# Patient Record
Sex: FEMALE | HISPANIC OR LATINO | Employment: OTHER | URBAN - METROPOLITAN AREA
[De-identification: names, ages, dates, MRNs, and addresses within clinical notes are randomized per-mention and may not be internally consistent; named-entity substitution may affect disease eponyms.]

---

## 2024-01-11 ENCOUNTER — OFFICE VISIT (OUTPATIENT)
Dept: FAMILY MEDICINE | Facility: CLINIC | Age: 19
End: 2024-01-11
Payer: COMMERCIAL

## 2024-01-11 VITALS
TEMPERATURE: 98.6 F | HEART RATE: 80 BPM | RESPIRATION RATE: 12 BRPM | WEIGHT: 113 LBS | BODY MASS INDEX: 21.34 KG/M2 | DIASTOLIC BLOOD PRESSURE: 74 MMHG | HEIGHT: 61 IN | OXYGEN SATURATION: 99 % | SYSTOLIC BLOOD PRESSURE: 116 MMHG

## 2024-01-11 DIAGNOSIS — Z00.00 ROUTINE HISTORY AND PHYSICAL EXAMINATION OF ADULT: Primary | ICD-10-CM

## 2024-01-11 PROCEDURE — 90480 ADMN SARSCOV2 VAC 1/ONLY CMP: CPT | Performed by: NURSE PRACTITIONER

## 2024-01-11 PROCEDURE — 90686 IIV4 VACC NO PRSV 0.5 ML IM: CPT | Performed by: NURSE PRACTITIONER

## 2024-01-11 PROCEDURE — 99385 PREV VISIT NEW AGE 18-39: CPT | Mod: 25 | Performed by: NURSE PRACTITIONER

## 2024-01-11 PROCEDURE — 90471 IMMUNIZATION ADMIN: CPT | Performed by: NURSE PRACTITIONER

## 2024-01-11 PROCEDURE — 91320 SARSCV2 VAC 30MCG TRS-SUC IM: CPT | Performed by: NURSE PRACTITIONER

## 2024-01-11 ASSESSMENT — ENCOUNTER SYMPTOMS
FEVER: 0
HEMATURIA: 0
ABDOMINAL PAIN: 0
CONSTIPATION: 1
NAUSEA: 0
SHORTNESS OF BREATH: 0
EYE PAIN: 0
NERVOUS/ANXIOUS: 1
COUGH: 0
DYSURIA: 0
HEARTBURN: 1
HEMATOCHEZIA: 0
MYALGIAS: 0
CHILLS: 0
SORE THROAT: 0
PALPITATIONS: 0
WEAKNESS: 0
PARESTHESIAS: 0
DIARRHEA: 0
DIZZINESS: 0
FREQUENCY: 0
HEADACHES: 1
JOINT SWELLING: 0
BREAST MASS: 0
ARTHRALGIAS: 0

## 2024-01-11 NOTE — PROGRESS NOTES
SUBJECTIVE:   Suki is a 18 year old, presenting for the following:    Here with mom whom she lives with  They operate a cleaning business together  Struggles with insomnia a bit which affects mood, will trial OTC remedies  Skips breakfast, counseled regarding healthy diet  Never sexually active. Monthly cramping periods. Advise motrin or consider hormone treatment with BCP. She will try the motrin and return if needs further address    Has had lipids tested in past normal.    Physical        2024     7:20 AM   Additional Questions   Roomed by LA   Accompanied by mother       Healthy Habits:     Getting at least 3 servings of Calcium per day:  Yes    Bi-annual eye exam:  Yes    Dental care twice a year:  NO    Sleep apnea or symptoms of sleep apnea:  None    Diet:  Breakfast skipped    Frequency of exercise:  1 day/week    Duration of exercise:  15-30 minutes    Taking medications regularly:  Yes    Medication side effects:  Not applicable      Today's PHQ-2 Score:       2024     7:19 AM   PHQ-2 (  [employer])   Q1: Little interest or pleasure in doing things 1   Q2: Feeling down, depressed or hopeless 0   PHQ-2 Score 1   Q1: Little interest or pleasure in doing things Several days   Q2: Feeling down, depressed or hopeless Not at all   PHQ-2 Score 1         Social History     Tobacco Use    Smoking status: Never     Passive exposure: Never    Smokeless tobacco: Never   Substance Use Topics    Alcohol use: Not on file             2024     7:18 AM   Alcohol Use   Prescreen: >3 drinks/day or >7 drinks/week? No     Reviewed orders with patient.  Reviewed health maintenance and updated orders accordingly - Yes      Breast Cancer Screenin2023 5 day periods, this was LMP    History of abnormal Pap smear: NO - under age 21, PAP not appropriate for age     Reviewed and updated as needed this visit by clinical staff   Tobacco  Allergies  Meds              Reviewed and updated as needed this  "visit by Provider                     Review of Systems   Constitutional:  Negative for chills and fever.   HENT:  Negative for congestion, ear pain, hearing loss and sore throat.    Eyes:  Negative for pain and visual disturbance.   Respiratory:  Negative for cough and shortness of breath.    Cardiovascular:  Negative for chest pain, palpitations and peripheral edema.   Gastrointestinal:  Positive for constipation and heartburn. Negative for abdominal pain, diarrhea, hematochezia and nausea.   Breasts:  Negative for tenderness, breast mass and discharge.   Genitourinary:  Positive for vaginal discharge. Negative for dysuria, frequency, genital sores, hematuria, pelvic pain, urgency and vaginal bleeding.   Musculoskeletal:  Negative for arthralgias, joint swelling and myalgias.   Skin:  Negative for rash.   Neurological:  Positive for headaches. Negative for dizziness, weakness and paresthesias.   Psychiatric/Behavioral:  Positive for mood changes. The patient is nervous/anxious.           OBJECTIVE:   /74 (BP Location: Right arm, Patient Position: Sitting, Cuff Size: Adult Regular)   Pulse 80   Temp 98.6  F (37  C) (Tympanic)   Resp 12   Ht 1.549 m (5' 1\")   Wt 51.3 kg (113 lb)   LMP 12/24/2023 (Exact Date)   SpO2 99%   BMI 21.35 kg/m    Physical Exam          ASSESSMENT/PLAN:       ICD-10-CM    1. Routine history and physical examination of adult  Z00.00                 COUNSELING:  Reviewed preventive health counseling, as reflected in patient instructions       Regular exercise       Healthy diet/nutrition       Vision screening       Contraception  Will get her vaccine records ffom [place]  Would like COVID and flu shot today      She reports that she has never smoked. She has never been exposed to tobacco smoke. She has never used smokeless tobacco.          Pamela Gomez NP  [facility]  "

## 2024-02-26 ENCOUNTER — ALLIED HEALTH/NURSE VISIT (OUTPATIENT)
Dept: FAMILY MEDICINE | Facility: CLINIC | Age: 19
End: 2024-02-26
Payer: COMMERCIAL

## 2024-02-26 DIAGNOSIS — Z23 ENCOUNTER FOR IMMUNIZATION: Primary | ICD-10-CM

## 2024-02-26 PROCEDURE — 90651 9VHPV VACCINE 2/3 DOSE IM: CPT

## 2024-02-26 PROCEDURE — 90472 IMMUNIZATION ADMIN EACH ADD: CPT

## 2024-02-26 PROCEDURE — 99207 PR NO CHARGE NURSE ONLY: CPT

## 2024-02-26 PROCEDURE — 90715 TDAP VACCINE 7 YRS/> IM: CPT

## 2024-02-26 PROCEDURE — 90471 IMMUNIZATION ADMIN: CPT

## 2024-02-26 NOTE — PROGRESS NOTES
Prior to immunization administration, verified patients identity using patient s name and date of birth. Please see Immunization Activity for additional information.     Screening Questionnaire for Adult Immunization    Are you sick today?   No   Do you have allergies to medications, food, a vaccine component or latex?   No   Have you ever had a serious reaction after receiving a vaccination?   No   Do you have a long-term health problem with heart, lung, kidney, or metabolic disease (e.g., diabetes), asthma, a blood disorder, no spleen, complement component deficiency, a cochlear implant, or a spinal fluid leak?  Are you on long-term aspirin therapy?   No   Do you have cancer, leukemia, HIV/AIDS, or any other immune system problem?   No   Do you have a parent, brother, or sister with an immune system problem?   No   In the past 3 months, have you taken medications that affect  your immune system, such as prednisone, other steroids, or anticancer drugs; drugs for the treatment of rheumatoid arthritis, Crohn s disease, or psoriasis; or have you had radiation treatments?   No   Have you had a seizure, or a brain or other nervous system problem?   No   During the past year, have you received a transfusion of blood or blood    products, or been given immune (gamma) globulin or antiviral drug?   No   For women: Are you pregnant or is there a chance you could become       pregnant during the next month?   No   Have you received any vaccinations in the past 4 weeks?   No     Immunization questionnaire answers were all negative.    I have reviewed the following standing orders:   This patient is due and qualifies for the HPV vaccine.    Click here for HPV (Adult 15-45Y) Standing Order     I have reviewed the vaccines inclusion and exclusion criteria;No concerns regarding eligibility.           This patient is due and qualifies for a TDAP vaccine.    Click here for Tdap Standing Order    I have reviewed the vaccines  inclusion and exclusion criteria; No concerns regarding eligibility.     Patient instructed to remain in clinic for 15 minutes afterwards, and to report any adverse reactions.     Screening performed by Marli Pinto MA on 2/26/2024 at 3:20 PM.

## 2024-03-22 ENCOUNTER — OFFICE VISIT (OUTPATIENT)
Dept: FAMILY MEDICINE | Facility: CLINIC | Age: 19
End: 2024-03-22
Payer: COMMERCIAL

## 2024-03-22 VITALS
DIASTOLIC BLOOD PRESSURE: 74 MMHG | RESPIRATION RATE: 16 BRPM | WEIGHT: 116.9 LBS | HEART RATE: 101 BPM | TEMPERATURE: 98.3 F | BODY MASS INDEX: 22.07 KG/M2 | HEIGHT: 61 IN | SYSTOLIC BLOOD PRESSURE: 106 MMHG | OXYGEN SATURATION: 98 %

## 2024-03-22 DIAGNOSIS — L23.9: Primary | ICD-10-CM

## 2024-03-22 PROCEDURE — 99213 OFFICE O/P EST LOW 20 MIN: CPT | Performed by: NURSE PRACTITIONER

## 2024-03-22 NOTE — PATIENT INSTRUCTIONS
Use over the counter Cortaid twice a day then Juli  Okay to use cetirizine (Zyrtec) 10mg once day for allergy symptoms  NO Latex gloves

## 2024-03-22 NOTE — PROGRESS NOTES
"  Assessment & Plan     (L23.9) Allergic contact dermatitis of hand  (primary encounter diagnosis)  Comment: No latex gloves  Plan: OTC Zyrtec 10 mg once daily and hydrocortisone cream twice a day                  Subjective   Suki is a 18 year old, presenting for the following health issues: patient has rash on back of both hands, skin is dry, sometimes the areas are painful and burning, using lotions seemed to be irritating so she stopped  Rash  Red rash first noted 1/19/24 on the left hand; then on the right. Reports her hands were painful with burning sensation last night. Ice helped momentarily. Started using new gloves with cleaning in January. She stopped wearing the gloves about 2 weeks ago and the redness is improving. Mother reports she has complained of some chest discomfort as well.      3/22/2024     3:02 PM   Additional Questions   Roomed by benita rutherford   Accompanied by self     Rash  Associated symptoms include a rash.   History of Present Illness       Reason for visit:  Rash and burning sensation on both of my hands  Symptom onset:  3-4 weeks ago  Symptoms include:  Rash, dryness,burning, swollen, redness  Symptom intensity:  Severe  Symptom progression:  Staying the same  Had these symptoms before:  No  What makes it worse:  Any liquid thats not water  What makes it better:  Ice pack    She eats 2-3 servings of fruits and vegetables daily.She consumes 1 sweetened beverage(s) daily.She exercises with enough effort to increase her heart rate 60 or more minutes per day.  She exercises with enough effort to increase her heart rate 5 days per week.   She is taking medications regularly.               Objective    /74 (BP Location: Right arm, Patient Position: Sitting)   Pulse 101   Temp 98.3  F (36.8  C)   Resp 16   Ht 1.549 m (5' 1\")   Wt 53 kg (116 lb 14.4 oz)   LMP 02/25/2024 (Exact Date)   SpO2 98%   Breastfeeding No   BMI 22.09 kg/m    Body mass index is 22.09 kg/m .  Physical Exam "   GENERAL: alert and no distress  RESP: lungs clear to auscultation - no rales, rhonchi or wheezes  CV: regular rate and rhythm, normal S1 S2, no S3 or S4, no murmur, click or rub, no peripheral edema  SKIN: xerosis - bilateral hands over knuckles            Signed Electronically by: Pamela Gomez NP

## 2024-04-26 ENCOUNTER — OFFICE VISIT (OUTPATIENT)
Dept: FAMILY MEDICINE | Facility: CLINIC | Age: 19
End: 2024-04-26
Payer: COMMERCIAL

## 2024-04-26 VITALS
DIASTOLIC BLOOD PRESSURE: 66 MMHG | WEIGHT: 115.2 LBS | RESPIRATION RATE: 20 BRPM | SYSTOLIC BLOOD PRESSURE: 110 MMHG | TEMPERATURE: 97.9 F | HEIGHT: 61 IN | HEART RATE: 83 BPM | OXYGEN SATURATION: 99 % | BODY MASS INDEX: 21.75 KG/M2

## 2024-04-26 DIAGNOSIS — R30.0 DYSURIA: ICD-10-CM

## 2024-04-26 DIAGNOSIS — R05.8 PRODUCTIVE COUGH: ICD-10-CM

## 2024-04-26 DIAGNOSIS — Z11.3 SCREENING FOR STDS (SEXUALLY TRANSMITTED DISEASES): Primary | ICD-10-CM

## 2024-04-26 LAB
ALBUMIN UR-MCNC: NEGATIVE MG/DL
APPEARANCE UR: CLEAR
BILIRUB UR QL STRIP: NEGATIVE
C TRACH DNA SPEC QL PROBE+SIG AMP: NEGATIVE
COLOR UR AUTO: YELLOW
GLUCOSE UR STRIP-MCNC: NEGATIVE MG/DL
HGB UR QL STRIP: NEGATIVE
KETONES UR STRIP-MCNC: NEGATIVE MG/DL
LEUKOCYTE ESTERASE UR QL STRIP: NEGATIVE
N GONORRHOEA DNA SPEC QL NAA+PROBE: NEGATIVE
NITRATE UR QL: NEGATIVE
PH UR STRIP: 7.5 [PH] (ref 5–7)
SP GR UR STRIP: 1.02 (ref 1–1.03)
UROBILINOGEN UR STRIP-ACNC: 0.2 E.U./DL

## 2024-04-26 PROCEDURE — 87491 CHLMYD TRACH DNA AMP PROBE: CPT | Performed by: PHYSICIAN ASSISTANT

## 2024-04-26 PROCEDURE — 99214 OFFICE O/P EST MOD 30 MIN: CPT | Performed by: PHYSICIAN ASSISTANT

## 2024-04-26 PROCEDURE — 81003 URINALYSIS AUTO W/O SCOPE: CPT | Mod: QW | Performed by: PHYSICIAN ASSISTANT

## 2024-04-26 PROCEDURE — 87591 N.GONORRHOEAE DNA AMP PROB: CPT | Performed by: PHYSICIAN ASSISTANT

## 2024-04-26 RX ORDER — CEFDINIR 300 MG/1
300 CAPSULE ORAL 2 TIMES DAILY
Qty: 14 CAPSULE | Refills: 0 | Status: SHIPPED | OUTPATIENT
Start: 2024-04-26 | End: 2024-05-22

## 2024-04-26 ASSESSMENT — ENCOUNTER SYMPTOMS
SORE THROAT: 1
HEADACHES: 1
COUGH: 1

## 2024-04-26 NOTE — PROGRESS NOTES
Results for orders placed or performed in visit on 04/26/24   UA Macroscopic with reflex to Microscopic and Culture - Lab Collect     Status: Abnormal    Specimen: Urine, Clean Catch   Result Value Ref Range    Color Urine Yellow Colorless, Straw, Light Yellow, Yellow    Appearance Urine Clear Clear    Glucose Urine Negative Negative mg/dL    Bilirubin Urine Negative Negative    Ketones Urine Negative Negative mg/dL    Specific Gravity Urine 1.020 1.003 - 1.035    Blood Urine Negative Negative    pH Urine 7.5 (H) 5.0 - 7.0    Protein Albumin Urine Negative Negative mg/dL    Urobilinogen Urine 0.2 0.2, 1.0 E.U./dL    Nitrite Urine Negative Negative    Leukocyte Esterase Urine Negative Negative    Narrative    Microscopic not indicated       Assessment & Plan     (Z11.3) Screening for STDs (sexually transmitted diseases)  (primary encounter diagnosis)  Comment:   Plan: Chlamydia trachomatis/Neisseria gonorrhoeae by         PCR- VAGINAL SELF-SWAB        Labs pending    (R30.0) Dysuria  Comment: Urine unremarkable  Plan: UA Macroscopic with reflex to Microscopic and         Culture - Lab Collect        Push fluids follow-up if symptoms persist    (R05.8) Productive cough  Comment: Due to lung findings and the productive cough we will cover with Omnicef should slowly steadily improve not acutely worsen or recheck  Plan:                   Subjective   Suki is a 18 year old, presenting for the following health issues:  Cough (Since Sunday ), Headache, Pharyngitis (Due to coughing ), and UTI (Dysuria, urinary frequency, lower back pain x 3 days )        4/26/2024     9:03 AM   Additional Questions   Roomed by LUISANA Louis   Accompanied by mom- Arcelia     Patient here with mother with complaint of cough since Sunday feels warm at night has not documented fever  Cough occasionally productive of green sputum  No nasal congestion no sore throat  No sick contact  No dysuria last 2 days no frequency no chills no  "hematuria    History of Present Illness       Reason for visit:  Cough  Symptom onset:  3-7 days ago  Symptoms include:  Fever, headeaches, back pain, cough, sore throat, burning when peeing  Symptom intensity:  Severe  Symptom progression:  Worsening  Had these symptoms before:  No  What makes it better:  No    She eats 2-3 servings of fruits and vegetables daily.She consumes 1 sweetened beverage(s) daily.She exercises with enough effort to increase her heart rate 20 to 29 minutes per day.  She exercises with enough effort to increase her heart rate 5 days per week.   She is taking medications regularly.                     Objective    /66 (BP Location: Right arm, Patient Position: Sitting, Cuff Size: Adult Regular)   Pulse 83   Temp 97.9  F (36.6  C) (Tympanic)   Resp 20   Ht 1.549 m (5' 1\")   Wt 52.3 kg (115 lb 3.2 oz)   LMP 04/04/2024 (Exact Date)   SpO2 99%   BMI 21.77 kg/m    Body mass index is 21.77 kg/m .  Physical Exam attentive no acute distress respirations nonlabored  Ears canals and drums normal  Nasal mucosa is clear  Eyes conjunctiva pink  Oropharynx benign  Neck supple no adenopathy  Lungs some scattered rhonchi but good air exchange  Cardiovascular regular rhythm  No CVA tenderness  Some mild suprapubic tenderness no guarding no rebound              Signed Electronically by: LENIN Castle    "

## 2024-05-22 ENCOUNTER — OFFICE VISIT (OUTPATIENT)
Dept: FAMILY MEDICINE | Facility: CLINIC | Age: 19
End: 2024-05-22
Payer: COMMERCIAL

## 2024-05-22 VITALS
HEIGHT: 61 IN | DIASTOLIC BLOOD PRESSURE: 66 MMHG | OXYGEN SATURATION: 99 % | HEART RATE: 73 BPM | WEIGHT: 115.3 LBS | SYSTOLIC BLOOD PRESSURE: 110 MMHG | RESPIRATION RATE: 16 BRPM | TEMPERATURE: 98.4 F | BODY MASS INDEX: 21.77 KG/M2

## 2024-05-22 DIAGNOSIS — B35.4 TINEA CORPORIS: Primary | ICD-10-CM

## 2024-05-22 PROCEDURE — 99213 OFFICE O/P EST LOW 20 MIN: CPT | Performed by: NURSE PRACTITIONER

## 2024-05-22 RX ORDER — NYSTATIN 100000 U/G
CREAM TOPICAL 2 TIMES DAILY
Qty: 15 G | Refills: 0 | Status: SHIPPED | OUTPATIENT
Start: 2024-05-22

## 2024-05-22 NOTE — PROGRESS NOTES
"  Assessment & Plan     (B35.4) Tinea corporis  (primary encounter diagnosis)  Comment:   Plan: nystatin (MYCOSTATIN) 247441 UNIT/GM external         cream        She has a small area on the left upper corner of her mons pubis approximately the size 3 cm diameter that is a little raised and flaky, there is no erythema, borders are fairly well-defined.  She has some growing out pubic hair throughout but no evidence of an ingrown hair or infected hair follicle.  She will treat with the nystatin for the next 2 weeks and return if things do not resolve.                Subjective   Suki is a 18 year old, presenting for the following health issues: patient complains of small bumps in her pubic area, itchy, noticed about one week ago    LMP 5/4 , uses pads for menses.  She put on zinc oxide and helped with itch    She has never been sexually active  She does shave in the pubic area and reuse the same razor 1 time  Derm Problem        5/22/2024     2:59 PM   Additional Questions   Roomed by benita rutherford   Accompanied by Mom - Lexi     History of Present Illness       Reason for visit:  Little bumps on my pubic area  Symptom onset:  3-7 days ago  Symptoms include:  Rash and sometimes burning  Symptom intensity:  Moderate  Symptom progression:  Staying the same  Had these symptoms before:  No  What makes it worse:  Wearing jeans or silk underwear  What makes it better:  Diaper rash cream    She eats 2-3 servings of fruits and vegetables daily.She consumes 1 sweetened beverage(s) daily.She exercises with enough effort to increase her heart rate 30 to 60 minutes per day.  She exercises with enough effort to increase her heart rate 3 or less days per week.   She is taking medications regularly.                 Objective    /66 (BP Location: Right arm, Patient Position: Sitting)   Pulse 73   Temp 98.4  F (36.9  C)   Resp 16   Ht 1.549 m (5' 1\")   Wt 52.3 kg (115 lb 4.8 oz)   LMP 05/04/2024 (Exact Date)   SpO2 99%  "  Breastfeeding No   BMI 21.79 kg/m    Body mass index is 21.79 kg/m .  Physical Exam   As above              Signed Electronically by: Pamela Gomez NP

## 2024-07-13 ENCOUNTER — HOSPITAL ENCOUNTER (EMERGENCY)
Facility: CLINIC | Age: 19
Discharge: HOME | End: 2024-07-13
Attending: EMERGENCY MEDICINE | Admitting: EMERGENCY MEDICINE
Payer: COMMERCIAL

## 2024-07-13 ENCOUNTER — OFFICE VISIT (OUTPATIENT)
Dept: URGENT CARE | Facility: URGENT CARE | Age: 19
End: 2024-07-13
Payer: COMMERCIAL

## 2024-07-13 ENCOUNTER — APPOINTMENT (OUTPATIENT)
Dept: CT IMAGING | Facility: CLINIC | Age: 19
End: 2024-07-13
Payer: COMMERCIAL

## 2024-07-13 VITALS
HEART RATE: 63 BPM | SYSTOLIC BLOOD PRESSURE: 126 MMHG | WEIGHT: 117 LBS | OXYGEN SATURATION: 100 % | BODY MASS INDEX: 22.11 KG/M2 | RESPIRATION RATE: 16 BRPM | DIASTOLIC BLOOD PRESSURE: 73 MMHG | TEMPERATURE: 97.5 F

## 2024-07-13 VITALS
BODY MASS INDEX: 21.99 KG/M2 | DIASTOLIC BLOOD PRESSURE: 67 MMHG | RESPIRATION RATE: 17 BRPM | WEIGHT: 116.5 LBS | OXYGEN SATURATION: 100 % | SYSTOLIC BLOOD PRESSURE: 110 MMHG | HEART RATE: 82 BPM | HEIGHT: 61 IN | TEMPERATURE: 97.7 F

## 2024-07-13 DIAGNOSIS — R10.9 ABDOMINAL PAIN, UNSPECIFIED ABDOMINAL LOCATION: ICD-10-CM

## 2024-07-13 DIAGNOSIS — R11.10 VOMITING AND DIARRHEA: ICD-10-CM

## 2024-07-13 DIAGNOSIS — R19.7 VOMITING AND DIARRHEA: ICD-10-CM

## 2024-07-13 DIAGNOSIS — R10.31 RIGHT LOWER QUADRANT PAIN: Primary | ICD-10-CM

## 2024-07-13 DIAGNOSIS — R63.0 ANOREXIA: ICD-10-CM

## 2024-07-13 LAB
ALBUMIN SERPL BCG-MCNC: 5.1 G/DL (ref 3.5–5.2)
ALBUMIN UR-MCNC: NEGATIVE MG/DL
ALP SERPL-CCNC: 98 U/L (ref 40–150)
ALT SERPL W P-5'-P-CCNC: 12 U/L (ref 0–50)
ANION GAP SERPL CALCULATED.3IONS-SCNC: 9 MMOL/L (ref 7–15)
APPEARANCE UR: CLEAR
AST SERPL W P-5'-P-CCNC: 21 U/L (ref 0–45)
BACTERIA #/AREA URNS HPF: ABNORMAL /HPF
BASOPHILS # BLD AUTO: 0.1 10E3/UL (ref 0–0.2)
BASOPHILS NFR BLD AUTO: 1 %
BILIRUB SERPL-MCNC: 0.5 MG/DL
BILIRUB UR QL STRIP: NEGATIVE
BUN SERPL-MCNC: 9.8 MG/DL (ref 6–20)
CALCIUM SERPL-MCNC: 10 MG/DL (ref 8.6–10)
CHLORIDE SERPL-SCNC: 101 MMOL/L (ref 98–107)
CLUE CELLS: ABNORMAL
COLOR UR AUTO: ABNORMAL
CREAT SERPL-MCNC: 0.62 MG/DL (ref 0.51–0.95)
DEPRECATED HCO3 PLAS-SCNC: 28 MMOL/L (ref 22–29)
EGFRCR SERPLBLD CKD-EPI 2021: >90 ML/MIN/1.73M2
EOSINOPHIL # BLD AUTO: 0.1 10E3/UL (ref 0–0.7)
EOSINOPHIL NFR BLD AUTO: 1 %
ERYTHROCYTE [DISTWIDTH] IN BLOOD BY AUTOMATED COUNT: 12.1 % (ref 10–15)
GLUCOSE SERPL-MCNC: 94 MG/DL (ref 70–99)
GLUCOSE UR STRIP-MCNC: NEGATIVE MG/DL
HCG SERPL QL: NEGATIVE
HCT VFR BLD AUTO: 43.6 % (ref 35–47)
HGB BLD-MCNC: 14.4 G/DL (ref 11.7–15.7)
HGB UR QL STRIP: NEGATIVE
IMM GRANULOCYTES # BLD: 0 10E3/UL
IMM GRANULOCYTES NFR BLD: 0 %
KETONES UR STRIP-MCNC: NEGATIVE MG/DL
LEUKOCYTE ESTERASE UR QL STRIP: NEGATIVE
LIPASE SERPL-CCNC: 24 U/L (ref 13–60)
LYMPHOCYTES # BLD AUTO: 2.5 10E3/UL (ref 0.8–5.3)
LYMPHOCYTES NFR BLD AUTO: 34 %
MCH RBC QN AUTO: 30.1 PG (ref 26.5–33)
MCHC RBC AUTO-ENTMCNC: 33 G/DL (ref 31.5–36.5)
MCV RBC AUTO: 91 FL (ref 78–100)
MONOCYTES # BLD AUTO: 0.5 10E3/UL (ref 0–1.3)
MONOCYTES NFR BLD AUTO: 7 %
NEUTROPHILS # BLD AUTO: 4.2 10E3/UL (ref 1.6–8.3)
NEUTROPHILS NFR BLD AUTO: 57 %
NITRATE UR QL: NEGATIVE
NRBC # BLD AUTO: 0 10E3/UL
NRBC BLD AUTO-RTO: 0 /100
PH UR STRIP: 8.5 [PH] (ref 5–7)
PLATELET # BLD AUTO: 297 10E3/UL (ref 150–450)
POTASSIUM SERPL-SCNC: 4.2 MMOL/L (ref 3.4–5.3)
PROT SERPL-MCNC: 8.3 G/DL (ref 6.4–8.3)
RBC # BLD AUTO: 4.78 10E6/UL (ref 3.8–5.2)
RBC URINE: 1 /HPF
SODIUM SERPL-SCNC: 138 MMOL/L (ref 135–145)
SP GR UR STRIP: 1.02 (ref 1–1.03)
SQUAMOUS EPITHELIAL: <1 /HPF
TRICHOMONAS, WET PREP: ABNORMAL
UROBILINOGEN UR STRIP-MCNC: <2 MG/DL
WBC # BLD AUTO: 7.3 10E3/UL (ref 4–11)
WBC URINE: 1 /HPF
WBC'S/HIGH POWER FIELD, WET PREP: ABNORMAL
YEAST, WET PREP: ABNORMAL

## 2024-07-13 PROCEDURE — 96374 THER/PROPH/DIAG INJ IV PUSH: CPT | Mod: 59

## 2024-07-13 PROCEDURE — 250N000011 HC RX IP 250 OP 636: Performed by: EMERGENCY MEDICINE

## 2024-07-13 PROCEDURE — 85041 AUTOMATED RBC COUNT: CPT | Performed by: EMERGENCY MEDICINE

## 2024-07-13 PROCEDURE — 96361 HYDRATE IV INFUSION ADD-ON: CPT

## 2024-07-13 PROCEDURE — 96375 TX/PRO/DX INJ NEW DRUG ADDON: CPT

## 2024-07-13 PROCEDURE — 250N000013 HC RX MED GY IP 250 OP 250 PS 637

## 2024-07-13 PROCEDURE — 99214 OFFICE O/P EST MOD 30 MIN: CPT | Performed by: PHYSICIAN ASSISTANT

## 2024-07-13 PROCEDURE — 250N000011 HC RX IP 250 OP 636

## 2024-07-13 PROCEDURE — 74177 CT ABD & PELVIS W/CONTRAST: CPT

## 2024-07-13 PROCEDURE — 99285 EMERGENCY DEPT VISIT HI MDM: CPT | Mod: 25

## 2024-07-13 PROCEDURE — 87210 SMEAR WET MOUNT SALINE/INK: CPT

## 2024-07-13 PROCEDURE — 81001 URINALYSIS AUTO W/SCOPE: CPT | Performed by: EMERGENCY MEDICINE

## 2024-07-13 PROCEDURE — 83690 ASSAY OF LIPASE: CPT | Performed by: EMERGENCY MEDICINE

## 2024-07-13 PROCEDURE — 36415 COLL VENOUS BLD VENIPUNCTURE: CPT | Performed by: EMERGENCY MEDICINE

## 2024-07-13 PROCEDURE — 80053 COMPREHEN METABOLIC PANEL: CPT | Performed by: EMERGENCY MEDICINE

## 2024-07-13 PROCEDURE — 258N000003 HC RX IP 258 OP 636: Performed by: EMERGENCY MEDICINE

## 2024-07-13 PROCEDURE — 84703 CHORIONIC GONADOTROPIN ASSAY: CPT | Performed by: EMERGENCY MEDICINE

## 2024-07-13 RX ORDER — ONDANSETRON 2 MG/ML
4 INJECTION INTRAMUSCULAR; INTRAVENOUS ONCE
Status: COMPLETED | OUTPATIENT
Start: 2024-07-13 | End: 2024-07-13

## 2024-07-13 RX ORDER — KETOROLAC TROMETHAMINE 15 MG/ML
15 INJECTION, SOLUTION INTRAMUSCULAR; INTRAVENOUS ONCE
Status: COMPLETED | OUTPATIENT
Start: 2024-07-13 | End: 2024-07-13

## 2024-07-13 RX ORDER — DICYCLOMINE HCL 20 MG
20 TABLET ORAL ONCE
Status: COMPLETED | OUTPATIENT
Start: 2024-07-13 | End: 2024-07-13

## 2024-07-13 RX ORDER — IOPAMIDOL 755 MG/ML
90 INJECTION, SOLUTION INTRAVASCULAR ONCE
Status: COMPLETED | OUTPATIENT
Start: 2024-07-13 | End: 2024-07-13

## 2024-07-13 RX ORDER — ONDANSETRON 4 MG/1
4 TABLET, ORALLY DISINTEGRATING ORAL ONCE
Status: COMPLETED | OUTPATIENT
Start: 2024-07-13 | End: 2024-07-13

## 2024-07-13 RX ORDER — DICYCLOMINE HCL 20 MG
20 TABLET ORAL 4 TIMES DAILY PRN
Qty: 20 TABLET | Refills: 0 | Status: SHIPPED | OUTPATIENT
Start: 2024-07-13 | End: 2024-07-23

## 2024-07-13 RX ADMIN — FAMOTIDINE 20 MG: 10 INJECTION, SOLUTION INTRAVENOUS at 17:27

## 2024-07-13 RX ADMIN — IOPAMIDOL 90 ML: 755 INJECTION, SOLUTION INTRAVENOUS at 17:45

## 2024-07-13 RX ADMIN — ONDANSETRON 4 MG: 4 TABLET, ORALLY DISINTEGRATING ORAL at 14:54

## 2024-07-13 RX ADMIN — KETOROLAC TROMETHAMINE 15 MG: 15 INJECTION, SOLUTION INTRAMUSCULAR; INTRAVENOUS at 17:52

## 2024-07-13 RX ADMIN — ONDANSETRON 4 MG: 2 INJECTION INTRAMUSCULAR; INTRAVENOUS at 16:32

## 2024-07-13 RX ADMIN — DICYCLOMINE HYDROCHLORIDE 20 MG: 20 TABLET ORAL at 20:17

## 2024-07-13 RX ADMIN — SODIUM CHLORIDE 1000 ML: 9 INJECTION, SOLUTION INTRAVENOUS at 16:32

## 2024-07-13 ASSESSMENT — COLUMBIA-SUICIDE SEVERITY RATING SCALE - C-SSRS
6. HAVE YOU EVER DONE ANYTHING, STARTED TO DO ANYTHING, OR PREPARED TO DO ANYTHING TO END YOUR LIFE?: NO
1. IN THE PAST MONTH, HAVE YOU WISHED YOU WERE DEAD OR WISHED YOU COULD GO TO SLEEP AND NOT WAKE UP?: NO
2. HAVE YOU ACTUALLY HAD ANY THOUGHTS OF KILLING YOURSELF IN THE PAST MONTH?: NO

## 2024-07-13 ASSESSMENT — ENCOUNTER SYMPTOMS
NAUSEA: 1
COUGH: 0
FEVER: 0
SHORTNESS OF BREATH: 0
ABDOMINAL PAIN: 1
DIARRHEA: 1

## 2024-07-13 ASSESSMENT — ACTIVITIES OF DAILY LIVING (ADL)
ADLS_ACUITY_SCORE: 35

## 2024-07-13 NOTE — ED PROVIDER NOTES
Emergency Department [facility] Supervisory Note     I had a face to face encounter with this patient who was also seen by the AYAH (PA / NP) who is currently serving as a AYAH provider in the Emergency Department. I have seen, examined, and discussed the patient with the AYAH and agree with their assessment and plan of management. Please refer to the AYAH note for further details and ED course.     Brief HPI:     Suki Velasco is a 19 year old female who presents for evaluation of abdominal pain, diarrhea, emesis.     I, Estrada Lutz , am serving as a scribe to document services personally performed by Prasanth Mancera, based on my observations and the provider's statements to me.   I, Prasanth Mancera, attest that Estrada Lutz  was acting in a scribe capacity, has observed my performance of the services and has documented them in accordance with my direction.    Brief Review of Systems:  Review of Systems   Constitutional:  Negative for fever.   Respiratory:  Negative for cough and shortness of breath.    Cardiovascular:  Negative for chest pain.   Gastrointestinal:  Positive for abdominal pain, diarrhea and nausea.   Skin:  Negative for rash.   All other systems reviewed and are negative.         Brief Physical Exam:  Physical Exam  Abdominal:      Tenderness: There is abdominal tenderness (Somewhat generalized but more in the periumbilical area). There is no guarding or rebound.         ED Course/MDM:  Suki Velasco was staffed with me. I agree with their assessment and plan of management, and I will see the patient.  I met with the patient to introduce myself, gather additional history, perform my initial exam, and discuss the plan.       I saw and examined the patient.  Her abdomen is soft and benign.  History and physical is most consistent with a transient gastroenteritis, however to be certain that this is not appendicitis we would need to do CT imaging and this was discussed with the patient and her family and  they would like to proceed with CT imaging.  CT imaging returned showing no acute abnormality.  Laboratory studies and urinalysis look normal.    Plan is to treat her symptomatically and have her follow-up with primary care      ED Course as of 07/13/24 1858   Sat Jul 13, 2024   1714 Patient is an otherwise healthy 19-year-old female presenting to the emergency department for evaluation of 1 week of intermittent left upper and lower abdominal pain with watery diarrhea, few episodes per day of diarrhea.  Her abdominal pain lasts for 40 minutes and then resolves.  Does not seem to be changed with eating.  She developed lightheaded dizziness yesterday, worse this morning it is positional in nature.  She had 1 episode of nonbloody emesis today.  Went to the [employer] yesterday and ate a lot of food and walked around for most of the day.  No household members with similar symptoms.  No new foods or recent travel.  No prior abdominal surgeries.  No concern for sexually transmitted infections.   1737 Initial vital signs reviewed and all within normal limits.  Patient is afebrile.  She is not tachycardic.  On exam, she is clinically well-appearing and nontoxic-appearing in no acute distress.  Abdomen soft, nondistended, with epigastric tenderness to palpation on my exam no rebound or guarding.  Slightly diminished bowel sounds.  No CVA tenderness.   1738 I considered a broad differential including GERD, PUD, gastritis, pancreatitis, hepatobiliary disease, gastroenteritis, appendicitis, diverticulitis, ureterolithiasis, pyelonephritis, cystitis, hernia.  Low clinical suspicion for small bowel obstruction/ileus, perforation, AAA, mesenteric ischemia. I have lower suspicion for symptoms secondary to cardiopulmonary or vascular process given history and exam. Also considered ovarian torsion, ovarian cyst, TOA, PID, and pain secondary to vagina/cervical infection but do feel gynecologic/ovarian etiology less likely.            No diagnosis found.    Labs and Imaging:  Results for orders placed or performed during the hospital encounter of 07/13/24   Result Value Ref Range    Lipase 24 13 - 60 U/L   Comprehensive metabolic panel   Result Value Ref Range    Sodium 138 135 - 145 mmol/L    Potassium 4.2 3.4 - 5.3 mmol/L    Carbon Dioxide (CO2) 28 22 - 29 mmol/L    Anion Gap 9 7 - 15 mmol/L    Urea Nitrogen 9.8 6.0 - 20.0 mg/dL    Creatinine 0.62 0.51 - 0.95 mg/dL    GFR Estimate >90 >60 mL/min/1.73m2    Calcium 10.0 8.6 - 10.0 mg/dL    Chloride 101 98 - 107 mmol/L    Glucose 94 70 - 99 mg/dL    Alkaline Phosphatase 98 40 - 150 U/L    AST 21 0 - 45 U/L    ALT 12 0 - 50 U/L    Protein Total 8.3 6.4 - 8.3 g/dL    Albumin 5.1 3.5 - 5.2 g/dL    Bilirubin Total 0.5 <=1.2 mg/dL   UA with Microscopic reflex to Culture    Specimen: Urine, Midstream   Result Value Ref Range    Color Urine Light Yellow Colorless, Straw, Light Yellow, Yellow    Appearance Urine Clear Clear    Glucose Urine Negative Negative mg/dL    Bilirubin Urine Negative Negative    Ketones Urine Negative Negative mg/dL    Specific Gravity Urine 1.016 1.001 - 1.030    Blood Urine Negative Negative    pH Urine 8.5 (H) 5.0 - 7.0    Protein Albumin Urine Negative Negative mg/dL    Urobilinogen Urine <2.0 <2.0 mg/dL    Nitrite Urine Negative Negative    Leukocyte Esterase Urine Negative Negative    Bacteria Urine Few (A) None Seen /HPF    RBC Urine 1 <=2 /HPF    WBC Urine 1 <=5 /HPF    Squamous Epithelials Urine <1 <=1 /HPF   HCG qualitative Blood   Result Value Ref Range    hCG Serum Qualitative Negative Negative   CBC with platelets and differential   Result Value Ref Range    WBC Count 7.3 4.0 - 11.0 10e3/uL    RBC Count 4.78 3.80 - 5.20 10e6/uL    Hemoglobin 14.4 11.7 - 15.7 g/dL    Hematocrit 43.6 35.0 - 47.0 %    MCV 91 78 - 100 fL    MCH 30.1 26.5 - 33.0 pg    MCHC 33.0 31.5 - 36.5 g/dL    RDW 12.1 10.0 - 15.0 %    Platelet Count 297 150 - 450 10e3/uL    % Neutrophils 57 %     % Lymphocytes 34 %    % Monocytes 7 %    % Eosinophils 1 %    % Basophils 1 %    % Immature Granulocytes 0 %    NRBCs per 100 WBC 0 <1 /100    Absolute Neutrophils 4.2 1.6 - 8.3 10e3/uL    Absolute Lymphocytes 2.5 0.8 - 5.3 10e3/uL    Absolute Monocytes 0.5 0.0 - 1.3 10e3/uL    Absolute Eosinophils 0.1 0.0 - 0.7 10e3/uL    Absolute Basophils 0.1 0.0 - 0.2 10e3/uL    Absolute Immature Granulocytes 0.0 <=0.4 10e3/uL    Absolute NRBCs 0.0 10e3/uL     I have reviewed the relevant laboratory and radiology studies      Prasanth Mancera MD  [facility] EMERGENCY ROOM  Critical access hospital[address] [facility] [id number] [phone]      Prasanth Mancera MD  07/13/24 0602

## 2024-07-13 NOTE — PROGRESS NOTES
Patient presents with:  Illness: Diarrhea, nausea, vomiting, chills, no sick contacts started on Monday getting worse      Clinical Decision Making:  Patient had pain at McBurney's point with a 5 to 6-day history of loss of appetite, vomiting and diarrhea.  Patient is sent to next higher level of care to the emergency room for evaluation of possible appendicitis.  Patient was given Zofran 4 mg ODT and instructions to be n.p.o. with presentation to the emergency room.  Questions were answered to patient's satisfaction before discharge      ICD-10-CM    1. Right lower quadrant pain  R10.31 ondansetron (ZOFRAN ODT) ODT tab 4 mg      2. Vomiting and diarrhea  R11.10 ondansetron (ZOFRAN ODT) ODT tab 4 mg    R19.7       3. Anorexia  R63.0 ondansetron (ZOFRAN ODT) ODT tab 4 mg          Patient Instructions   Drink anything by mouth.    Present to the emergency room for evaluation and treatment of right lower quadrant abdominal pain        HPI:  Suki Velasco is a 19 year old female who presents today for 6-day history nausea vomiting diarrhea and abdominal pain.  Patient has had chills and fatigue.  No known sick contacts.  Has had some slight dizziness.  No pleuritic chest pain syncope presyncope loss of taste or smell or sore throat.  Had slight headache but no urinary symptoms.  No skin rash.  Pain is located to the left and right lower quadrant of the abdomen.  She has had loss of appetite.  No COVID testing was performed at home.  No antipyretic was taken.  Patient denies urinary symptoms and pregnancy.  No contraception use.  No reported mucus in the stools.    History obtained from chart review and the patient.    Problem List:  There are no relevant problems documented for this patient.      No past medical history on file.    Social History     Tobacco Use    Smoking status: Never     Passive exposure: Never    Smokeless tobacco: Never   Substance Use Topics    Alcohol use: Not on file       Review of  Systems  As above in HPI otherwise negative.    Vitals:    07/13/24 1437   BP: 126/73   Pulse: 63   Resp: 16   Temp: 97.5  F (36.4  C)   TempSrc: Tympanic   SpO2: 100%   Weight: 53.1 kg (117 lb)       General: Patient is resting comfortably no acute distress is afebrile  HEENT: Head is normocephalic atraumatic   eyes are PERRL EOMI sclera anicteric   TMs are clear bilaterally  Throat is clear and no exudate  No cervical lymphadenopathy present  LUNGS: Clear to auscultation bilaterally  HEART: Regular rate and rhythm  Abdomen: Patient has pain at McBurney's point also some pain in the left lower quadrant but negative Rovsing's  There is rebound in the right lower quadrant.  No CVA tenderness to percussion  Skin: Without rash non-diaphoretic no conjunctival pallor capillary refills less than 2 seconds    Physical Exam      At the end of the encounter, I discussed results, diagnosis, medications. Discussed red flags for immediate return to clinic/ER, as well as indications for follow up if no improvement. Patient understood and agreed to plan. Patient was stable for discharge.

## 2024-07-13 NOTE — ED PROVIDER NOTES
EMERGENCY DEPARTMENT ENCOUNTER      NAME: Suki Velasco  AGE: 19 year old female  YOB: 2005  MRN: [id number]  EVALUATION DATE & TIME: 07/13/24 4:45 PM    PCP: No Ref-Primary, Physician    ED PROVIDER: Adriana Richard PA-C      CHIEF COMPLAINT:  Abdominal Pain, Emesis, and Diarrhea      FINAL IMPRESSION:  1. Abdominal pain, unspecified abdominal location          ED COURSE & MEDICAL DECISION MAKING:  Pertinent Labs & Imaging studies reviewed. (See chart for details)    ED Course as of 08/09/24 1003   Sat Jul 13, 2024   1714 Patient is an otherwise healthy 19-year-old female presenting to the emergency department for evaluation of 1 week of intermittent left upper and lower abdominal pain with watery diarrhea, few episodes per day of diarrhea.  Her abdominal pain lasts for 40 minutes and then resolves.  Does not seem to be changed with eating.  She developed lightheaded dizziness yesterday, worse this morning it is positional in nature.  She had 1 episode of nonbloody emesis today.  Went to the [place] [employer] [facility] yesterday and ate a lot of food and walked around for most of the day.  No household members with similar symptoms.  No new foods or recent travel.  No prior abdominal surgeries.  No concern for sexually transmitted infections.   1737 Initial vital signs reviewed and all within normal limits.  Patient is afebrile.  She is not tachycardic.  On exam, she is clinically well-appearing and nontoxic-appearing in no acute distress.  Abdomen soft, nondistended, with epigastric tenderness to palpation on my exam no rebound or guarding.  Slightly diminished bowel sounds.  No CVA tenderness.   1738 I considered a broad differential including GERD, PUD, gastritis, pancreatitis, hepatobiliary disease, gastroenteritis, appendicitis, diverticulitis, ureterolithiasis, pyelonephritis, cystitis, hernia.  Low clinical suspicion for small bowel obstruction/ileus, perforation, AAA, mesenteric  ischemia. I have lower suspicion for symptoms secondary to cardiopulmonary or vascular process given history and exam. Also considered ovarian torsion, ovarian cyst, TOA, PID, and pain secondary to vagina/cervical infection but do feel gynecologic/ovarian etiology less likely.      CBC reassuring with no leukocytosis, hemoglobin stable at 14.4.  CMP without significant electrolyte abnormality, elevation in LFTs, or evidence of ALONSO.  Lipase within normal limits.  Pregnancy testing negative.  Urinalysis and wet prep not suggestive of infection.  CT abdomen pelvis obtained which showed no definite acute abnormality and specifically was negative for appendicitis at this time.    The patient feels significantly improved after ketorolac, famotidine, Zofran, Bentyl, and a liter of fluids here.  Overall, patient history, exam, workup most consistent with abdominal pain.   1930 Repeat abdominal examination benign.  Discussed plan for discharge home with prescription for Bentyl for symptoms and plan for close outpatient follow-up with primary care clinician within the next 2-3 days for close recheck. The patient verbalized understanding and is comfortable with this plan. Symptomatic home cares discussed. Emphasized importance of follow up with primary care clinician. Strict return precautions discussed.        At the conclusion of the encounter I discussed the results of all of the tests and the disposition. The questions were answered. The patient or family acknowledged understanding and was agreeable with the care plan.       ED COURSE:  4:53 PM I met and introduced myself to the patient. I gathered initial history and performed an initial physical exam. We discussed options and plan for diagnostics and treatment here in the ED.  5:27 PM I have staffed the patient with Dr. Mancera, ED physician, who has evaluated the patient and agrees with all aspects of today's care.   5:32 PM I rechecked and updated the patient.  7:21 PM I  discussed the plan for discharge with the patient, and patient is agreeable. We discussed supportive cares at home and reasons for return to the ER including new or worsening symptoms - all questions and concerns addressed to the best of my ability. Strict return precautions discussed. Patient to be discharged by RN.      Medical Decision Making  Obtained supplemental history:Supplemental history obtained?: Family Member/Significant Other  Reviewed external records: External records reviewed?: Outpatient Record: [facility] on 7/13/24  Care impacted by chronic illness:N/A  Care significantly affected by social determinants of health:N/A  Did you consider but not order tests?: Work up considered but not performed and documented in chart, if applicable  Did you interpret images independently?: Independent interpretation of ECG and images noted in documentation, when applicable.  Consultation discussion with other provider:Did you involve another provider (consultant, , pharmacy, etc.)?: I discussed the care with another health care provider, see documentation for details.  Discharge. I prescribed additional prescription strength medication(s) as charted. See documentation for any additional details.      CRITICAL CARE:  Not applicable      MEDICATIONS GIVEN IN THE EMERGENCY:  Medications   ondansetron (ZOFRAN) injection 4 mg (4 mg Intravenous $Given 7/13/24 1632)   sodium chloride 0.9% BOLUS 1,000 mL (0 mLs Intravenous Stopped 7/13/24 1741)   famotidine (PEPCID) injection 20 mg (20 mg Intravenous $Given 7/13/24 1727)   iopamidol (ISOVUE-370) solution 90 mL (90 mLs Intravenous $Given 7/13/24 1745)   ketorolac (TORADOL) injection 15 mg (15 mg Intravenous $Given 7/13/24 1752)   dicyclomine (BENTYL) tablet 20 mg (20 mg Oral $Given 7/13/24 2017)       NEW PRESCRIPTIONS STARTED AT TODAY'S ER VISIT  Discharge Medication List as of 7/13/2024  8:20 PM        START taking these medications     Details   dicyclomine (BENTYL) 20 MG tablet Take 1 tablet (20 mg) by mouth 4 times daily as needed (for irritable bowel), Disp-20 tablet, R-0, Local Print                =================================================================    HPI    Patient information was obtained from: Patient     Use of Intrepreter: N/A        Suki NARCISA Velasco is a 19 year old female with no pertinent medical history who presents to the emergency department for evaluation of abdominal pain.    Patient reports intermittent mid abdominal pain that radiates to left side and sometimes to right side and started a week ago, and diarrhea that started 5 days ago (7/8/24) with 2 episodes a day. She endorses lightheadedness and 1 episode of vomiting today. Patient says the abdominal pain last for 40 minutes at a time and worsens with increased pressure on the area. She mentioned some dysuria yesterday but denies any today. Mom says the patient was given Pepcid without relief and dad notes that they went to the [place] yesterday. Otherwise, patient denies any sick contacts, recent travel, new foods, history of abdominal surgeries, taking any prescriptions, history of anything similar, recent antibiotic use, chance of pregnancy, unusual vaginal discharge, cough, cold, congestion, or ear pain. No other complaints at this time.     Per chart review: patient was seen at [facility] on 7/13/24 for abdominal pain. Patient was sent to ED for concern of appendicitis.     PAST MEDICAL HISTORY:  History reviewed. No pertinent past medical history.    PAST SURGICAL HISTORY:  History reviewed. No pertinent surgical history.    CURRENT MEDICATIONS:    Prior to Admission Medications   Prescriptions Last Dose Informant Patient Reported? Taking?   Multiple Vitamins-Minerals (DAILY MULTI PO)   Yes No   Sig: Take 1 tablet by mouth daily   Patient not taking: Reported on 7/13/2024   nystatin (MYCOSTATIN) 784684  "UNIT/GM external cream   No No   Sig: Apply topically 2 times daily   Patient not taking: Reported on 7/13/2024      Facility-Administered Medications Last Administration Doses Remaining   ondansetron (ZOFRAN ODT) ODT tab 4 mg 7/13/2024  2:54 PM 0          ALLERGIES:  Allergies   Allergen Reactions    Latex Dermatitis    Penicillins Rash       FAMILY HISTORY:  History reviewed. No pertinent family history.    SOCIAL HISTORY:  Social History     Tobacco Use    Smoking status: Never     Passive exposure: Never    Smokeless tobacco: Never   Vaping Use    Vaping status: Never Used        VITALS:    First Vitals:  No data found.      No data found.        PHYSICAL EXAM  VITAL SIGNS: /67   Pulse 82   Temp 97.7  F (36.5  C) (Temporal)   Resp 17   Ht 1.549 m (5' 1\")   Wt 52.8 kg (116 lb 8 oz)   LMP 07/01/2024 (Exact Date)   SpO2 100%   BMI 22.01 kg/m     GENERAL: Awake, alert, answering questions appropriately.  HEENT: Moist mucous membranes. Posterior oropharynx clear with no erythema, no exudate. No tonsillar hypertrophy. Uvula midline. Tolerating secretions, no drooling.    SPEECH:  Easy to understand speech, Normal volume and vera. Normal phonation.  PULMONARY: No respiratory distress, Breathing comfortably on room air. Lungs clear to auscultation bilaterally.  CARDIOVASCULAR: Regular rate and rhythm, radial pulses present, symmetric, and normal.  ABDOMINAL: Soft, Nondistended, No rebound or guarding. Mild epigastric tenderness.  Slightly diminished bowel sounds.  BACK: No CVA tenderness.  EXTREMITIES: Extremities are warm and well perfused. No lower extremity edema.  NEUROLOGIC: Moving all extremities spontaneously.   SKIN: Exposed areas of skin warm, dry, no rashes.  PSYCH: Normal mood and affect.       RADIOLOGY/LAB:  Reviewed all pertinent imaging. Please see official radiology report.  All pertinent labs reviewed and interpreted.  Results for orders placed or performed during the hospital encounter " of 07/13/24   CT Abdomen Pelvis w Contrast    Impression    IMPRESSION:   1.  No definite acute abnormality in the abdomen or pelvis. Specifically, normal appendix.   Result Value Ref Range    Lipase 24 13 - 60 U/L   Comprehensive metabolic panel   Result Value Ref Range    Sodium 138 135 - 145 mmol/L    Potassium 4.2 3.4 - 5.3 mmol/L    Carbon Dioxide (CO2) 28 22 - 29 mmol/L    Anion Gap 9 7 - 15 mmol/L    Urea Nitrogen 9.8 6.0 - 20.0 mg/dL    Creatinine 0.62 0.51 - 0.95 mg/dL    GFR Estimate >90 >60 mL/min/1.73m2    Calcium 10.0 8.6 - 10.0 mg/dL    Chloride 101 98 - 107 mmol/L    Glucose 94 70 - 99 mg/dL    Alkaline Phosphatase 98 40 - 150 U/L    AST 21 0 - 45 U/L    ALT 12 0 - 50 U/L    Protein Total 8.3 6.4 - 8.3 g/dL    Albumin 5.1 3.5 - 5.2 g/dL    Bilirubin Total 0.5 <=1.2 mg/dL   UA with Microscopic reflex to Culture    Specimen: Urine, Midstream   Result Value Ref Range    Color Urine Light Yellow Colorless, Straw, Light Yellow, Yellow    Appearance Urine Clear Clear    Glucose Urine Negative Negative mg/dL    Bilirubin Urine Negative Negative    Ketones Urine Negative Negative mg/dL    Specific Gravity Urine 1.016 1.001 - 1.030    Blood Urine Negative Negative    pH Urine 8.5 (H) 5.0 - 7.0    Protein Albumin Urine Negative Negative mg/dL    Urobilinogen Urine <2.0 <2.0 mg/dL    Nitrite Urine Negative Negative    Leukocyte Esterase Urine Negative Negative    Bacteria Urine Few (A) None Seen /HPF    RBC Urine 1 <=2 /HPF    WBC Urine 1 <=5 /HPF    Squamous Epithelials Urine <1 <=1 /HPF   HCG qualitative Blood   Result Value Ref Range    hCG Serum Qualitative Negative Negative   CBC with platelets and differential   Result Value Ref Range    WBC Count 7.3 4.0 - 11.0 10e3/uL    RBC Count 4.78 3.80 - 5.20 10e6/uL    Hemoglobin 14.4 11.7 - 15.7 g/dL    Hematocrit 43.6 35.0 - 47.0 %    MCV 91 78 - 100 fL    MCH 30.1 26.5 - 33.0 pg    MCHC 33.0 31.5 - 36.5 g/dL    RDW 12.1 10.0 - 15.0 %    Platelet Count 297 150 -  450 10e3/uL    % Neutrophils 57 %    % Lymphocytes 34 %    % Monocytes 7 %    % Eosinophils 1 %    % Basophils 1 %    % Immature Granulocytes 0 %    NRBCs per 100 WBC 0 <1 /100    Absolute Neutrophils 4.2 1.6 - 8.3 10e3/uL    Absolute Lymphocytes 2.5 0.8 - 5.3 10e3/uL    Absolute Monocytes 0.5 0.0 - 1.3 10e3/uL    Absolute Eosinophils 0.1 0.0 - 0.7 10e3/uL    Absolute Basophils 0.1 0.0 - 0.2 10e3/uL    Absolute Immature Granulocytes 0.0 <=0.4 10e3/uL    Absolute NRBCs 0.0 10e3/uL   Wet prep    Specimen: Vagina; Swab   Result Value Ref Range    Trichomonas Absent Absent    Yeast Absent Absent    Clue Cells Absent Absent    WBCs/high power field 2+ (A) None         EKG:  None      PROCEDURES:  None        I, Heidi Ruvalcaba, am serving as a scribe to document services personally performed by Adriana Richard PA-C, based on my observation and the provider's statements to me. I, Adriana Richard PA-C attest that Heidi Ruvalcaba is acting in a scribe capacity, has observed my performance of the services and has documented them in accordance with my direction.         Adriana Richard PA-C  Emergency Medicine  [facility] EMERGENCY ROOM  [address] [id number] [phone]  Dept: [phone]     Adriana Richard PA-C  08/09/24 1007

## 2024-07-13 NOTE — ED TRIAGE NOTES
PT states she has had left upper and lower abd pain for the past week. She states she started having diarrhea and emesis today.          Triage Assessment (Adult)       Row Name 07/13/24 1539          Triage Assessment    Airway WDL WDL        Respiratory WDL    Respiratory WDL WDL        Skin Circulation/Temperature WDL    Skin Circulation/Temperature WDL WDL        Cardiac WDL    Cardiac WDL WDL        Peripheral/Neurovascular WDL    Peripheral Neurovascular WDL WDL        Cognitive/Neuro/Behavioral WDL    Cognitive/Neuro/Behavioral WDL WDL

## 2024-07-13 NOTE — PATIENT INSTRUCTIONS
Drink anything by mouth.    Present to the emergency room for evaluation and treatment of right lower quadrant abdominal pain

## 2024-07-14 NOTE — DISCHARGE INSTRUCTIONS
You were seen in the emergency department today for your symptoms.  Your testing here today was reassuring.    Please was provided for dicyclomine (Bentyl) to take up to 4 times a day as needed for your symptoms.      Please follow-up with your primary care clinician within the next 48 to 72 hours for close recheck.  Please call to schedule a follow-up appointment with [facility] as needed.    Please return to the emergency department if you develop any new, worsening, or concerning symptoms including but not limited to the following:     Call 911 anytime you think you may need emergency care. For example, call if:    You passed out (lost consciousness).     You pass maroon or very bloody stools.     You vomit blood or what looks like coffee grounds.     You have severe belly pain.   Call your doctor now or seek immediate medical care if:    Your pain gets worse, especially if it becomes focused in one area of your belly.     You have a new or higher fever.     Your stools are black and look like tar, or they have streaks of blood.     You have unexpected vaginal bleeding.     You have symptoms of a urinary tract infection. These may include:  Pain when you urinate.  Urinating more often than usual.  Blood in your urine.     You are dizzy or lightheaded, or you feel like you may faint.   Watch closely for changes in your health, and be sure to contact your doctor if:    You are not getting better as expected.

## 2025-02-02 ASSESSMENT — PATIENT HEALTH QUESTIONNAIRE - PHQ9
SUM OF ALL RESPONSES TO PHQ QUESTIONS 1-9: 9
SUM OF ALL RESPONSES TO PHQ QUESTIONS 1-9: 9
10. IF YOU CHECKED OFF ANY PROBLEMS, HOW DIFFICULT HAVE THESE PROBLEMS MADE IT FOR YOU TO DO YOUR WORK, TAKE CARE OF THINGS AT HOME, OR GET ALONG WITH OTHER PEOPLE: VERY DIFFICULT

## 2025-02-03 ENCOUNTER — OFFICE VISIT (OUTPATIENT)
Dept: FAMILY MEDICINE | Facility: CLINIC | Age: 20
End: 2025-02-03
Payer: COMMERCIAL

## 2025-02-03 VITALS
HEART RATE: 82 BPM | HEIGHT: 61 IN | OXYGEN SATURATION: 99 % | RESPIRATION RATE: 14 BRPM | SYSTOLIC BLOOD PRESSURE: 120 MMHG | DIASTOLIC BLOOD PRESSURE: 64 MMHG | BODY MASS INDEX: 22.51 KG/M2 | TEMPERATURE: 97.7 F | WEIGHT: 119.2 LBS

## 2025-02-03 DIAGNOSIS — G25.81 RESTLESS LEGS SYNDROME (RLS): Primary | ICD-10-CM

## 2025-02-03 LAB
ERYTHROCYTE [DISTWIDTH] IN BLOOD BY AUTOMATED COUNT: 11.7 % (ref 10–15)
FERRITIN SERPL-MCNC: 40 NG/ML (ref 6–175)
HCT VFR BLD AUTO: 40.4 % (ref 35–47)
HGB BLD-MCNC: 13.5 G/DL (ref 11.7–15.7)
IRON BINDING CAPACITY (ROCHE): 359 UG/DL (ref 240–430)
IRON SATN MFR SERPL: 27 % (ref 15–46)
IRON SERPL-MCNC: 96 UG/DL (ref 37–145)
MCH RBC QN AUTO: 30.1 PG (ref 26.5–33)
MCHC RBC AUTO-ENTMCNC: 33.4 G/DL (ref 31.5–36.5)
MCV RBC AUTO: 90 FL (ref 78–100)
PLATELET # BLD AUTO: 279 10E3/UL (ref 150–450)
RBC # BLD AUTO: 4.48 10E6/UL (ref 3.8–5.2)
WBC # BLD AUTO: 8.1 10E3/UL (ref 4–11)

## 2025-02-03 PROCEDURE — 36415 COLL VENOUS BLD VENIPUNCTURE: CPT | Performed by: INTERNAL MEDICINE

## 2025-02-03 PROCEDURE — 83540 ASSAY OF IRON: CPT | Performed by: INTERNAL MEDICINE

## 2025-02-03 PROCEDURE — G2211 COMPLEX E/M VISIT ADD ON: HCPCS | Performed by: INTERNAL MEDICINE

## 2025-02-03 PROCEDURE — 99214 OFFICE O/P EST MOD 30 MIN: CPT | Performed by: INTERNAL MEDICINE

## 2025-02-03 PROCEDURE — 82728 ASSAY OF FERRITIN: CPT | Performed by: INTERNAL MEDICINE

## 2025-02-03 PROCEDURE — 85027 COMPLETE CBC AUTOMATED: CPT | Performed by: INTERNAL MEDICINE

## 2025-02-03 PROCEDURE — 83550 IRON BINDING TEST: CPT | Performed by: INTERNAL MEDICINE

## 2025-02-03 RX ORDER — GABAPENTIN 300 MG/1
300 CAPSULE ORAL 3 TIMES DAILY
Qty: 90 CAPSULE | Refills: 0 | Status: SHIPPED | OUTPATIENT
Start: 2025-02-03

## 2025-02-03 NOTE — ASSESSMENT & PLAN NOTE
-Typical symptoms for at least 2 years  -Family history in maternal grandmother  -History of heavy menses suggest the possibility of iron deficiency  -Discussed the role of iron deficiency and RLS  -Discussed treatment options and recommended gabapentin  -Follow-up in 1 to 2 months

## 2025-02-03 NOTE — PROGRESS NOTES
Assessment & Plan   Problem List Items Addressed This Visit       Restless legs syndrome (RLS) - Primary     -Typical symptoms for at least 2 years  -Family history in maternal grandmother  -History of heavy menses suggest the possibility of iron deficiency  -Discussed the role of iron deficiency and RLS  -Discussed treatment options and recommended gabapentin  -Follow-up in 1 to 2 months         Relevant Medications    gabapentin (NEURONTIN) 300 MG capsule    Other Relevant Orders    CBC with platelets    Iron and iron binding capacity    Ferritin                   Subjective   Suki is a 19 year old, presenting for the following health issues:  Sleep Problem (Having a hard time falling asleep, excessive drooling (daily). Melatonin 5 mg did not work for patient. )        2/3/2025     2:57 PM   Additional Questions   Roomed by Cinda LIN CMA   Accompanied by None     History of Present Illness       Reason for visit:  Sleeping problems    She eats 2-3 servings of fruits and vegetables daily.She consumes 1 sweetened beverage(s) daily.She exercises with enough effort to increase her heart rate 60 or more minutes per day.  She exercises with enough effort to increase her heart rate 5 days per week.   She is taking medications regularly.  Patient complains of at least 2 years of difficulty falling asleep.  Sleeps in her own room is quiet and dark.  A little bit of stress as her 2 siblings are away in [place] though returning soon.  She goes to bed at approximately 10 and often is still awake at 1 AM when she hears her grandfather come into the house.  Her legs bother her in it when she is trying to sleep such that she needs to move them.  Periods tend to be heavy.  Nocturia once or twice per night.  No morning headaches, snoring, heartburn.  Gets up at 740 with an alarm.  Not much daytime sleepiness.          Review of Systems  Constitutional, HEENT, cardiovascular, pulmonary, gi and gu systems are negative, except as  "otherwise noted.      Objective    /64   Pulse 82   Temp 97.7  F (36.5  C)   Resp 14   Ht 1.543 m (5' 0.75\")   Wt 54.1 kg (119 lb 3.2 oz)   LMP 01/13/2025 (Exact Date)   SpO2 99%   BMI 22.71 kg/m    Body mass index is 22.71 kg/m .  Physical Exam   Healthy-appearing young woman in no distress  HEENT exam unremarkable  Neck is not thick  Lungs clear  Cardiac exam regular, no murmur, no edema  Alert, oriented, speech and cognition intact, cranial nerves normal, gait normal  Normal mood and affect          The longitudinal plan of care for the diagnosis(es)/condition(s) as documented were addressed during this visit. Due to the added complexity in care, I will continue to support Suki in the subsequent management and with ongoing continuity of care.    Signed Electronically by: Aristides Pearl MD    "